# Patient Record
Sex: FEMALE | Race: WHITE | Employment: FULL TIME | URBAN - METROPOLITAN AREA
[De-identification: names, ages, dates, MRNs, and addresses within clinical notes are randomized per-mention and may not be internally consistent; named-entity substitution may affect disease eponyms.]

---

## 2021-03-29 ENCOUNTER — OFFICE VISIT (OUTPATIENT)
Dept: FAMILY MEDICINE CLINIC | Facility: CLINIC | Age: 26
End: 2021-03-29
Payer: COMMERCIAL

## 2021-03-29 VITALS
TEMPERATURE: 98 F | HEIGHT: 63.25 IN | DIASTOLIC BLOOD PRESSURE: 86 MMHG | WEIGHT: 206.5 LBS | BODY MASS INDEX: 36.14 KG/M2 | HEART RATE: 109 BPM | RESPIRATION RATE: 18 BRPM | SYSTOLIC BLOOD PRESSURE: 116 MMHG | OXYGEN SATURATION: 99 %

## 2021-03-29 DIAGNOSIS — N20.0 LEFT NEPHROLITHIASIS: ICD-10-CM

## 2021-03-29 DIAGNOSIS — R10.9 RIGHT FLANK PAIN: Primary | ICD-10-CM

## 2021-03-29 DIAGNOSIS — M54.9 MID BACK PAIN ON RIGHT SIDE: ICD-10-CM

## 2021-03-29 LAB
APPEARANCE: CLEAR
BILIRUBIN: NEGATIVE
GLUCOSE (URINE DIPSTICK): NEGATIVE MG/DL
KETONES (URINE DIPSTICK): NEGATIVE MG/DL
LEUKOCYTES: NEGATIVE
MULTISTIX LOT#: 5077 NUMERIC
NITRITE, URINE: NEGATIVE
OCCULT BLOOD: NEGATIVE
PH, URINE: 6.5 (ref 4.5–8)
PROTEIN (URINE DIPSTICK): NEGATIVE MG/DL
SPECIFIC GRAVITY: 1.02 (ref 1–1.03)
URINE-COLOR: YELLOW
UROBILINOGEN,SEMI-QN: 0.2 MG/DL (ref 0–1.9)

## 2021-03-29 PROCEDURE — 3079F DIAST BP 80-89 MM HG: CPT | Performed by: FAMILY MEDICINE

## 2021-03-29 PROCEDURE — 99203 OFFICE O/P NEW LOW 30 MIN: CPT | Performed by: FAMILY MEDICINE

## 2021-03-29 PROCEDURE — 87086 URINE CULTURE/COLONY COUNT: CPT | Performed by: FAMILY MEDICINE

## 2021-03-29 PROCEDURE — 81003 URINALYSIS AUTO W/O SCOPE: CPT | Performed by: FAMILY MEDICINE

## 2021-03-29 PROCEDURE — 87088 URINE BACTERIA CULTURE: CPT | Performed by: FAMILY MEDICINE

## 2021-03-29 PROCEDURE — 3008F BODY MASS INDEX DOCD: CPT | Performed by: FAMILY MEDICINE

## 2021-03-29 PROCEDURE — 87186 SC STD MICRODIL/AGAR DIL: CPT | Performed by: FAMILY MEDICINE

## 2021-03-29 PROCEDURE — 3074F SYST BP LT 130 MM HG: CPT | Performed by: FAMILY MEDICINE

## 2021-03-29 RX ORDER — HYDROCODONE BITARTRATE AND ACETAMINOPHEN 5; 325 MG/1; MG/1
2 TABLET ORAL NIGHTLY
Qty: 10 TABLET | Refills: 0 | Status: SHIPPED | OUTPATIENT
Start: 2021-03-29 | End: 2021-04-03

## 2021-03-29 NOTE — PROGRESS NOTES
Patient here to establish care. Also ER f/u. Seen at [facility] ER on 3/25/21 for back pain. Pain started to flare up x 1-2 weeks. Pain on and off x 2-3 years. Patient states it may be from epidural done 5 years ago.

## 2021-03-29 NOTE — PATIENT INSTRUCTIONS
Follow up in 2 days recommended to reviewed Urine Cx and current symptoms     Will need to get her physical done as well - Annual physical pending with PAP     Possibility of symptoms being due to ?  Stone that passed and still having some pain in the are

## 2021-03-29 NOTE — PROGRESS NOTES
[address] [facility] Family Medicine Office Note  Chief Complaint:   Patient presents with:  [facility] [place] [facility]  Er F/u: Back pain x 1-2 weeks.        HPI:   This is a 30year old female coming in for  Pain started 4 days prior to the ER   Seen in the [facility] • Hypertension Mother    • Diabetes Mother    • Heart Disorder Mother    • Bipolar Disorder Sister      Allergies:  No Known Allergies  Current Meds:  Current Outpatient Medications   Medication Sig Dispense Refill   • HYDROcodone-acetaminophen ([address]) that she relates to child birth like that she felt around 3/21-22 and then this dissipated) and the finding of a stone on the L side corroborates a hx of nephrolithiasis.  Also has a FH of kidney stones needing lithotripsy (in mother)        Follow up in 2

## 2021-03-30 ENCOUNTER — TELEPHONE (OUTPATIENT)
Dept: FAMILY MEDICINE CLINIC | Facility: CLINIC | Age: 26
End: 2021-03-30

## 2021-03-30 NOTE — TELEPHONE ENCOUNTER
She's actually taking aleve. Was told she can take 2 BID. She had 1 dose yesterday in the evening, didn't hurt during the day. Wondering if there is something that will relieve the pain but not make her groggy.  Re: new med, states she gets kind of anxious

## 2021-03-31 RX ORDER — CIPROFLOXACIN 500 MG/1
500 TABLET, FILM COATED ORAL 2 TIMES DAILY
Qty: 20 TABLET | Refills: 0 | Status: SHIPPED | OUTPATIENT
Start: 2021-03-31 | End: 2021-04-10

## 2021-03-31 NOTE — TELEPHONE ENCOUNTER
Future Appointments   Date Time Provider Cherry Zarate   4/5/2021 11:20 AM Mimi Steiner MD [facility] Anuj Ribera

## 2021-03-31 NOTE — TELEPHONE ENCOUNTER
Pt called, was she going to be contacted today regarding her back pain?    Please call pt at [phone]

## 2021-03-31 NOTE — TELEPHONE ENCOUNTER
Called and discussed change in treatment plan. Noted 10-50,000 CFU of E.Coli. Rx Cipro 500 mg twice daily X 10 days - denies possibility of pregnancy - will be following up on 4/5/2021. Stop Aleve and take Tylenol 1 Gm every 8 hours as needed for pain.

## 2021-04-01 ENCOUNTER — TELEPHONE (OUTPATIENT)
Dept: FAMILY MEDICINE CLINIC | Facility: CLINIC | Age: 26
End: 2021-04-01

## 2021-04-01 NOTE — TELEPHONE ENCOUNTER
Dr Blanc Speak:  Your Culture results are back and there is a good chance that your pain should resolve once you are done with your antibiotic. Please make sure you complete the 10 day course of Ciprofloxacin. Take care ~ MM    Pt advised of above.  Pt verbali

## 2021-04-01 NOTE — TELEPHONE ENCOUNTER
Left detailed VM message    Continue tylenol and warm compresses for pain, encourage to hydrate, give abx 48-72 hrs to really start working. Advised pt to call back with questions/concerns.

## 2021-04-05 ENCOUNTER — OFFICE VISIT (OUTPATIENT)
Dept: FAMILY MEDICINE CLINIC | Facility: CLINIC | Age: 26
End: 2021-04-05
Payer: COMMERCIAL

## 2021-04-05 VITALS
SYSTOLIC BLOOD PRESSURE: 122 MMHG | HEART RATE: 103 BPM | WEIGHT: 209.13 LBS | RESPIRATION RATE: 16 BRPM | BODY MASS INDEX: 37 KG/M2 | TEMPERATURE: 98 F | OXYGEN SATURATION: 97 % | DIASTOLIC BLOOD PRESSURE: 88 MMHG

## 2021-04-05 DIAGNOSIS — Z09 FOLLOW UP: ICD-10-CM

## 2021-04-05 DIAGNOSIS — Z00.00 ANNUAL PHYSICAL EXAM: Primary | ICD-10-CM

## 2021-04-05 PROCEDURE — 3079F DIAST BP 80-89 MM HG: CPT | Performed by: FAMILY MEDICINE

## 2021-04-05 PROCEDURE — 3074F SYST BP LT 130 MM HG: CPT | Performed by: FAMILY MEDICINE

## 2021-04-05 PROCEDURE — 99395 PREV VISIT EST AGE 18-39: CPT | Performed by: FAMILY MEDICINE

## 2021-04-05 NOTE — PROGRESS NOTES
Here for PE and f/u. Patient states she is feeling better. Pain comes and goes at night. Abx and extra strength tylenol are helping.  No PAP today due to menstrual period starting this am.

## 2021-04-05 NOTE — PROGRESS NOTES
[facility] Family Medicine Office Note  Chief Complaint:   Patient presents with:  Physical  Follow - Up      HPI:   This is a 30year old female coming in for annual physical, she also had her PAP scheduled but now cancelled because she just got for as mentioned in the HPI      EXAM:   /88   Pulse 103   Temp 97.5 °F (36.4 °C) (Temporal)   Resp 16   Wt 209 lb 2 oz (94.9 kg)   LMP 04/05/2021   SpO2 97%   BMI 36.75 kg/m²  Estimated body mass index is 36.75 kg/m² as calculated from the following There are no barriers to learning. Medical education done. Outcome: Patient verbalizes understanding. Patient is notified to call with any questions, complications, allergies, or worsening or changing symptoms.   Patient is to call with any side effects o

## 2021-04-05 NOTE — PROGRESS NOTES
AVS given to pt. Instructed to schedule nurse visit for fasting labs and to schedule pap when finished with period. Pt verbalized understanding, left in stable condition.

## 2021-04-08 ENCOUNTER — NURSE ONLY (OUTPATIENT)
Dept: FAMILY MEDICINE CLINIC | Facility: CLINIC | Age: 26
End: 2021-04-08
Payer: COMMERCIAL

## 2021-04-08 DIAGNOSIS — Z00.00 ANNUAL PHYSICAL EXAM: ICD-10-CM

## 2021-04-08 PROCEDURE — 80061 LIPID PANEL: CPT | Performed by: FAMILY MEDICINE

## 2021-04-08 PROCEDURE — 80050 GENERAL HEALTH PANEL: CPT | Performed by: FAMILY MEDICINE

## 2021-04-08 PROCEDURE — 36415 COLL VENOUS BLD VENIPUNCTURE: CPT | Performed by: FAMILY MEDICINE

## 2021-04-08 PROCEDURE — 83036 HEMOGLOBIN GLYCOSYLATED A1C: CPT | Performed by: FAMILY MEDICINE

## 2021-04-08 NOTE — PROGRESS NOTES
Vinicius Sanchez present in office for nurse visit. Labs drawn as ordered by Dr. Gina Almendarez    22 g Right AC lavender tube green tube    All questions/concerns addressed. Patient left in stable condition.

## 2021-04-09 ENCOUNTER — TELEPHONE (OUTPATIENT)
Dept: FAMILY MEDICINE CLINIC | Facility: CLINIC | Age: 26
End: 2021-04-09

## 2021-04-09 NOTE — TELEPHONE ENCOUNTER
----- Message from Oz Thompson MD sent at 4/9/2021 12:52 PM CDT -----  [employer] message sent to the patient.

## 2021-04-09 NOTE — TELEPHONE ENCOUNTER
See result note. Will follow-up to confirm patient read message. [facility]   All your labs are normal except for some really elevated cholesterol numbers, and mildly elevated fasting glucose (you are likely borderline diabetic).  We should work on a

## 2021-04-12 ENCOUNTER — TELEPHONE (OUTPATIENT)
Dept: FAMILY MEDICINE CLINIC | Facility: CLINIC | Age: 26
End: 2021-04-12

## 2021-04-12 NOTE — TELEPHONE ENCOUNTER
Pt reports right lower back pain still there- now is \"100\" /10, unable to sleep due to pain. Pt c/o nausea and vomiting- unable to keep down water or food. Pt reports voiding dark orange, denies burning or frequency.    Pt stated she stopped taking the

## 2021-04-12 NOTE — TELEPHONE ENCOUNTER
PT CALLED TO ADV THAT SHE HAD BEEN TREATED FOR A KIDNEY INFECTION. PT ADV THAT SHE STILL IS TAKING THE MEDICATION BUT ADV SHE FELT BETTER FOR A SHORT PERIOD OF TIME BUT IS STARTING TO NOT FEEL WELL AGAIN.        URINE IS DARK ORANGE, NOT ABLE TO SLEEP DU

## 2021-04-12 NOTE — TELEPHONE ENCOUNTER
Pt advised of below, to go to ER immediately. Reiterated that she has been treated for UTI with PO cipro and suggested for her to bring medication with her to ER. Pt verbalized understanding and advised to notify us with updates at her convenience.

## 2021-04-12 NOTE — TELEPHONE ENCOUNTER
I am concerned about the possibility of her infection having not cleared up because she missed 2 days of medication.  If she is nauseous and vomiting, and not keeping anything down, at this time I am concerned about the possibility of a complicated kidney i

## 2021-04-15 ENCOUNTER — TELEPHONE (OUTPATIENT)
Dept: FAMILY MEDICINE CLINIC | Facility: CLINIC | Age: 26
End: 2021-04-15

## 2021-04-15 NOTE — TELEPHONE ENCOUNTER
Pt called and requested to speak with Dr Nick Little. Pt states she has been in the hospital for a week and she would like to touch base with Dr Nick Little. I asked if I could relay a message and the pt decline.

## 2021-04-16 NOTE — TELEPHONE ENCOUNTER
Talked to HungSallis. She is back home after having had an ERCP cholecystectomy at [facility], and was also treated for her Pyelonephritis with Levaquin. Called patient - she is doing well. No nausea or vomiting at this time.  Pain is appropriate s/p surgery

## 2022-10-24 NOTE — TELEPHONE ENCOUNTER
PT CALLED AND ADV THAT SHE WAS SEEN YESTERDAY AND ADV THAT THE [facility]VIL IS NOT DOING ANYTHING FOR THE PAIN. 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     LOOKING FOR OTHER RECOMMENDATIONS TO Wound Care: Petrolatum Winlevi Pregnancy And Lactation Text: This medication is considered safe during pregnancy and breastfeeding.

## 2024-03-04 ENCOUNTER — TELEPHONE (OUTPATIENT)
Dept: FAMILY MEDICINE CLINIC | Facility: CLINIC | Age: 29
End: 2024-03-04

## 2024-04-23 ENCOUNTER — PATIENT OUTREACH (OUTPATIENT)
Dept: FAMILY MEDICINE CLINIC | Facility: CLINIC | Age: 29
End: 2024-04-23